# Patient Record
Sex: MALE | Race: WHITE | NOT HISPANIC OR LATINO | Employment: FULL TIME | URBAN - METROPOLITAN AREA
[De-identification: names, ages, dates, MRNs, and addresses within clinical notes are randomized per-mention and may not be internally consistent; named-entity substitution may affect disease eponyms.]

---

## 2018-10-30 ENCOUNTER — OFFICE VISIT (OUTPATIENT)
Dept: SLEEP MEDICINE | Facility: HOSPITAL | Age: 23
End: 2018-10-30
Attending: INTERNAL MEDICINE

## 2018-10-30 VITALS
SYSTOLIC BLOOD PRESSURE: 91 MMHG | HEIGHT: 74 IN | DIASTOLIC BLOOD PRESSURE: 59 MMHG | BODY MASS INDEX: 21.43 KG/M2 | HEART RATE: 66 BPM | OXYGEN SATURATION: 99 % | WEIGHT: 167 LBS

## 2018-10-30 DIAGNOSIS — R06.83 SNORING: Primary | ICD-10-CM

## 2018-10-30 DIAGNOSIS — F51.12 INSUFFICIENT SLEEP SYNDROME: ICD-10-CM

## 2018-10-30 DIAGNOSIS — G47.33 OSA (OBSTRUCTIVE SLEEP APNEA): ICD-10-CM

## 2018-10-30 PROCEDURE — G0463 HOSPITAL OUTPT CLINIC VISIT: HCPCS

## 2018-10-30 NOTE — PROGRESS NOTES
[facility]      Patient Care Team:  Campbell Plascencia MD as PCP - General (Family Medicine)    Referring Provider: Val Sumner MD    Chief complaint:   Concerns for sleep apnea.  Feeling tired all day long.    History of present illness:  This is a 22-year-old male patient with no significant past medical history who presented for evaluation of daytime fatigue and sleepiness.    He stated that he watched his grandfather had similar symptoms in the past and he was ultimately diagnosed with sleep apnea.  Patient endorsed waking up gasping but this does not occur daily and it happens occasionally.  He also reported loud snoring which disturb people around him.  He said that the snoring has been going on for years.  His mom used to complain about a lot.  He also said that he awakens himself snoring sometimes.  He was also told that he stops breathing on occasions.      He is not bothered by the snoring however he does get very sleepy throughout the day.  He said that he falls asleep often when not engaged in activities.     Sleep schedule:  -Bedtime:  2-3 AM on the weekdays  -Sleep latency:  Few minutes  -Wake up time:  6:48 AM on the weekdays and 11 AM-1 PM on the weekends , does  not feel refreshed  -Nocturnal awakening:  None   -Perceived sleep hours:  2-4 hours on the weekdays and 5-7 hours on the weekends    ESS: Total score: 17 . He denies sleep paralysis, hallucination or cataplexy.    Review of Systems  Constitutional: No fever or chills. Poor appetite.  ENMT: Nasal congestion but no postnasal drip or hoarsness  Cardiovascular: No chest pain, palpitation or legs swelling.    Respiratory: Shortness of breath on exertion and cough.  Gastrointestinal: No constipation, diarrhea, abdominal pain or acid reflux  Neurology: No headache, weakness, numbness or dizziness.   Musculoskeletal: No joints pain, stiffness or swelling.   Psychiatry: Anxiety and  "depression.  Hem/Lymphatic: No swollen glands or easy bruising.  Integumentary: No rash.  Endocrinology: No excessive thirst but warm intolerance.   Urinary: No dysuria, bloody urine or frequent urination.     History  Past medical history:  Anxiety and depression reportedly    Past surgical history:  None    Medications:  Benadryl  Zyrtec    Allergy:  NKDA    Social history:  Patient works at the [employer] store.  He does smoke one pack of cigarettes daily since the age of 21 years old.  He does not drink alcohol or caffeinated beverages.    Family history:  Positive for sleep apnea in his grandfather.  Positive for stroke, diabetes, asthma and hypertension.      Vital Signs:  Vitals:    10/30/18 0900   BP: 91/59   Pulse: 66   SpO2: 99%   Weight: 75.8 kg (167 lb)   Height: 188 cm (74\")     Body mass index is 21.44 kg/m².  Neck Circumference: 15 inches     Physical Exam:  Neck Circumference: 15 inches     Constitutional: Not in acute distress.  Eyes: Injected conjunctiva, EOMI.  ENMT: Narayan score 3. Mallampati score 3. No oral thrush.   Neck: Large. No thyromegaly.    Heart: Regular rhythm and rate, no murmur  Lungs: Good and equal air entry bilaterally. No crackles or wheezing.         Abdomen: Soft   Extremities: No cyanosis, clubbing or pitting edema. Moves all extremities.  Neuro: Conscious, alert, oriented x3.   Psych: Appropriate mood and affect.    Integumentary: No rash  Lymphatic: No palpable cervical or supraclavicular lymph nodes.       Assessment:  Diagnoses and all orders for this visit:    Snoring    Insufficient sleep syndrome    He is at moderate risk for obstructive sleep apnea based on his symptoms but his main issue appears to be insufficient sleep causing hypersomnia.    Plan:  Discussed the importance of sleep hygiene with the patient and the need to obtain at least 7-8 hours of sleep every night.    Will check a home sleep apnea testing to see if he has underlying sleep apnea.  I discussed " the pathophysiology of sleep apnea with the patient, testing and therapy.  He is agreeable with treatment.  He believes he has sleep apnea since it runs in his family.  He would be a good candidate for auto CPAP therapy if he has sleep apnea.        Dallas Manzo MD  10/30/18  11:40 AM    This note was dictated utilizing Dragon dictation

## 2018-11-20 ENCOUNTER — APPOINTMENT (OUTPATIENT)
Dept: SLEEP MEDICINE | Facility: HOSPITAL | Age: 23
End: 2018-11-20
Attending: INTERNAL MEDICINE

## 2018-12-17 ENCOUNTER — HOSPITAL ENCOUNTER (OUTPATIENT)
Dept: SLEEP MEDICINE | Facility: HOSPITAL | Age: 23
Discharge: HOME | End: 2018-12-17
Attending: INTERNAL MEDICINE | Admitting: INTERNAL MEDICINE

## 2018-12-17 DIAGNOSIS — G47.33 OSA (OBSTRUCTIVE SLEEP APNEA): ICD-10-CM

## 2018-12-17 PROCEDURE — 95806 SLEEP STUDY UNATT&RESP EFFT: CPT

## 2018-12-28 ENCOUNTER — TELEPHONE (OUTPATIENT)
Dept: SLEEP MEDICINE | Facility: HOSPITAL | Age: 23
End: 2018-12-28

## 2018-12-28 NOTE — TELEPHONE ENCOUNTER
Tech called pts home #, pts mother answered and stated could give her the study results. Patient is no longer a minor so not documentation on release of information seen to patients mother. Patients mother gave tech his cell # to give results to. Tech called [phone] pts cell. Patient answered and went over neg study results. Informed patient should he have questions or like to schedule F/U eun to discuss he could and staff would be back in office Weds in view of holidays. MAB

## 2019-01-15 ENCOUNTER — OFFICE VISIT (OUTPATIENT)
Dept: SLEEP MEDICINE | Facility: HOSPITAL | Age: 24
End: 2019-01-15
Attending: INTERNAL MEDICINE

## 2019-01-15 VITALS
SYSTOLIC BLOOD PRESSURE: 108 MMHG | DIASTOLIC BLOOD PRESSURE: 66 MMHG | WEIGHT: 171 LBS | BODY MASS INDEX: 21.94 KG/M2 | HEIGHT: 74 IN | HEART RATE: 72 BPM

## 2019-01-15 DIAGNOSIS — F51.12 INSUFFICIENT SLEEP SYNDROME: ICD-10-CM

## 2019-01-15 DIAGNOSIS — R06.83 SNORING: Primary | ICD-10-CM

## 2019-01-15 PROCEDURE — G0463 HOSPITAL OUTPT CLINIC VISIT: HCPCS

## 2019-01-15 NOTE — PROGRESS NOTES
"                      [facility]                          Chief Complaint:   Follow up for the result of the sleep study    History of present illness:   Patient is a 23 y.o. male  Patient with history of Depression and Anxiety who complains of loud snoring, gasping and witnessed apneas.     No changes in symptoms since the last visit however he stated that when he did the sleep test, he woke up feeling more rejuvenated.     Sleep schedule:  Variable.    ESS: Total score: 15         Vital Signs:  Vitals:    01/15/19 0909   BP: 108/66   Pulse: 72   Weight: 77.6 kg (171 lb)   Height: 188 cm (74\")     Body mass index is 21.96 kg/m².          Physical Exam:   General Appearance:    Alert, cooperative, in no acute distress   HEENT:  Narayan score 3. Mallampati score 3. No oral thrush.    Lungs:     Clear to auscultation,respirations regular, even and                  unlabored    Heart:    Regular rhythm and normal rate, normal S1 and S2, no            murmur, no gallop, no rub, no click   Abdomen:     Normal bowel sounds, no masses, no organomegaly, soft        non-tender, non-distended, no guarding, no rebound                tenderness   Neuro:   Conscious, alert, oriented x3. Appropriate mood and affect.    Extremities:   Moves all extremities well, no edema, no cyanosis, no             Redness          Diagnostic data:    HST was performed on:12/18/18:    total RADHA: 2 events/h. Supine time was 294.9 minutes resulting in Supine RADHA: 2.2 events/hour.  KAREEN=3.3 events/h; Philip SpO2=91 %.      ASSESSMENT:  Diagnoses and all orders for this visit:    Snoring    Insufficient sleep syndrome      PLAN:  I discussed the result of the sleep test.  I reassured the patient about the result of the sleep test and the absence of sleep apnea.  We discussed treatment of snoring with positional therapy if needed.    Concerning the excessive daytime sleepiness, I think this is related to " insufficient sleep syndrome and he has to increase his sleep time to 7-8 hours at night.                This note was dictated utilizing "[employer] dictation

## 2021-01-08 ENCOUNTER — OFFICE VISIT (OUTPATIENT)
Dept: FAMILY MEDICINE CLINIC | Facility: CLINIC | Age: 26
End: 2021-01-08

## 2021-01-08 VITALS
OXYGEN SATURATION: 99 % | WEIGHT: 155 LBS | HEART RATE: 59 BPM | BODY MASS INDEX: 19.89 KG/M2 | SYSTOLIC BLOOD PRESSURE: 114 MMHG | TEMPERATURE: 97.7 F | HEIGHT: 74 IN | DIASTOLIC BLOOD PRESSURE: 68 MMHG

## 2021-01-08 DIAGNOSIS — K21.9 GASTROESOPHAGEAL REFLUX DISEASE WITHOUT ESOPHAGITIS: ICD-10-CM

## 2021-01-08 DIAGNOSIS — H91.90: Primary | ICD-10-CM

## 2021-01-08 DIAGNOSIS — F32.9 REACTIVE DEPRESSION: ICD-10-CM

## 2021-01-08 DIAGNOSIS — F41.9 ANXIETY: ICD-10-CM

## 2021-01-08 PROCEDURE — 99204 OFFICE O/P NEW MOD 45 MIN: CPT | Performed by: FAMILY MEDICINE

## 2021-01-08 RX ORDER — ESCITALOPRAM OXALATE 10 MG/1
10 TABLET ORAL DAILY
Qty: 90 TABLET | Refills: 3 | Status: SHIPPED | OUTPATIENT
Start: 2021-01-08 | End: 2021-02-12 | Stop reason: ALTCHOICE

## 2021-01-08 RX ORDER — OMEPRAZOLE 40 MG/1
40 CAPSULE, DELAYED RELEASE ORAL DAILY
Qty: 90 CAPSULE | Refills: 1 | Status: SHIPPED | OUTPATIENT
Start: 2021-01-08 | End: 2021-01-11

## 2021-01-08 NOTE — PROGRESS NOTES
Subjective   Wesley Brothers is a 25 y.o. male.     Chief Complaint   Patient presents with   • Establish Care   • Heartburn   • Anxiety     lost gma and uncle 1 week apart       History of Present Illness   She has been having heartburn. Belching. For months.     Patient is been a having anxiety.  Started when he lost his great grandma and his uncle 1 week apart a month ago.  Has been going on for years but was controllable but worse for a month. Recently quit smoking. Some depressed moods.    Does have hearing loss and wears hearing aides.     The following portions of the patient's history were reviewed and updated as appropriate: allergies, current medications, past family history, past medical history, past social history, past surgical history and problem list.    Past Medical History:   Diagnosis Date   • HL (hearing loss)     hearing aids       History reviewed. No pertinent surgical history.    Family History   Problem Relation Age of Onset   • Anxiety disorder Mother    • Diabetes Maternal Grandmother    • Anxiety disorder Maternal Grandmother         past with a divorce   • Hypertension Father        Social History     Socioeconomic History   • Marital status: Single     Spouse name: Not on file   • Number of children: Not on file   • Years of education: Not on file   • Highest education level: Not on file   Tobacco Use   • Smoking status: Former Smoker   • Smokeless tobacco: Never Used   Substance and Sexual Activity   • Alcohol use: Not Currently   • Drug use: Not Currently       Review of Systems   Constitutional: Negative for fatigue and fever.   HENT: Positive for hearing loss. Negative for ear pain.    Eyes: Negative for pain and visual disturbance.   Respiratory: Negative for chest tightness and shortness of breath.    Cardiovascular: Negative for chest pain and palpitations.   Gastrointestinal: Negative for constipation and diarrhea.   Genitourinary: Negative for dysuria and urinary incontinence.  "  Musculoskeletal: Negative for arthralgias and myalgias.   Skin: Negative for rash and skin lesions.   Neurological: Negative for headache and memory problem.   Psychiatric/Behavioral: Negative for depressed mood. The patient is not nervous/anxious.        Objective   Visit Vitals  /68   Pulse 59   Temp 97.7 °F (36.5 °C) (Infrared)   Ht 188 cm (74\")   Wt 70.3 kg (155 lb)   SpO2 99%   BMI 19.90 kg/m²     Body mass index is 19.9 kg/m².  Physical Exam  Constitutional:       Appearance: Normal appearance. He is well-developed.   Cardiovascular:      Rate and Rhythm: Normal rate and regular rhythm.      Heart sounds: Normal heart sounds.   Pulmonary:      Effort: Pulmonary effort is normal.      Breath sounds: Normal breath sounds.   Abdominal:      General: Abdomen is flat. There is no distension.      Palpations: Abdomen is soft.      Tenderness: There is no abdominal tenderness.   Musculoskeletal: Normal range of motion.         General: No swelling.   Skin:     General: Skin is warm and dry.      Findings: No rash.   Neurological:      General: No focal deficit present.      Mental Status: He is alert and oriented to person, place, and time.   Psychiatric:         Mood and Affect: Mood normal.         Behavior: Behavior normal.           Assessment/Plan   Diagnoses and all orders for this visit:    1. Hereditary hearing loss (Primary)    2. Gastroesophageal reflux disease without esophagitis  -     omeprazole (PrilOSEC) 40 MG capsule; Take 1 capsule by mouth Daily.  Dispense: 90 capsule; Refill: 1    3. Anxiety    4. Reactive depression    Other orders  -     escitalopram (Lexapro) 10 MG tablet; Take 1 tablet by mouth Daily.  Dispense: 90 tablet; Refill: 3        Discussed risk and benefits of medication.  Follow-up in 1 month.  Gave counseling resources.       "

## 2021-01-08 NOTE — TELEPHONE ENCOUNTER
Caller: Wesley Brothers    Relationship: Self    Best call back number: [phone] (H)    Medication needed:   Requested Prescriptions     Pending Prescriptions Disp Refills   • omeprazole (PrilOSEC) 40 MG capsule 90 capsule 1     Sig: Take 1 capsule by mouth Daily.       When do you need the refill by: 01/08/21    What details did the patient provide when requesting the medication: PATIENT WAS JUST PRESCRIBED MEDICIATION TODAY. PHARMACY STATES NEED PRIOR AUTHORIZATION     Does the patient have less than a 3 day supply:  [x] Yes  [] No    What is the patient's preferred pharmacy: [id number] [place], [place] - [address] [facility] [address] [facility] [phone] [facility] [phone]

## 2021-01-11 ENCOUNTER — TELEPHONE (OUTPATIENT)
Dept: FAMILY MEDICINE CLINIC | Facility: CLINIC | Age: 26
End: 2021-01-11

## 2021-01-11 DIAGNOSIS — K21.9 GASTROESOPHAGEAL REFLUX DISEASE WITHOUT ESOPHAGITIS: Primary | ICD-10-CM

## 2021-01-11 RX ORDER — OMEPRAZOLE 40 MG/1
40 CAPSULE, DELAYED RELEASE ORAL DAILY
Qty: 90 CAPSULE | Refills: 1 | OUTPATIENT
Start: 2021-01-11

## 2021-01-11 RX ORDER — ESOMEPRAZOLE MAGNESIUM 40 MG/1
40 CAPSULE, DELAYED RELEASE ORAL
Qty: 90 CAPSULE | Refills: 1 | Status: SHIPPED | OUTPATIENT
Start: 2021-01-11 | End: 2021-08-11 | Stop reason: SDUPTHER

## 2021-01-11 NOTE — TELEPHONE ENCOUNTER
THE PATIENT CALLED IN AND STATED THAT HE NEEDS A PRIOR AUTHORIZATION ON A MEDICATION. THE MEDICATION PRILOSEC AND NEXIUM.    PLEASE ADVISE.    CALLBACK NUMBER: [phone]

## 2021-01-13 ENCOUNTER — TELEPHONE (OUTPATIENT)
Dept: FAMILY MEDICINE CLINIC | Facility: CLINIC | Age: 26
End: 2021-01-13

## 2021-01-13 NOTE — TELEPHONE ENCOUNTER
Spoke with pt and advised that he stop taking medication and if he is feeling  SOA, tongue or throat swelling and trouble swallowing.  He needs to go to the ER.

## 2021-01-13 NOTE — TELEPHONE ENCOUNTER
PATIENT IS CALLING TO LET Aylin SAAVEDRA KNOW THAT HE IS EXPERIENCING SIDE EFFECTS OIF THE MEDICATION ESCITALOPRAM 10MG. HE IS HAVING DIARRHEA,  MAKING HIS SHORTNESS OF BREATH WORSE FROM HIS ANXIETY, AND HIS TONGUE AND MOUTH FELT VERY DRY AND POSSIBLY SWOLLEN. HE IS STILL TAKING THE MEDICATION. PATIENT IS NOT SURE IF HE SHOULD KEEP TAKING IT OR STOP TAKING IT IMMEDIATELY.     PLEASE ADVISE.    CONTACT: [phone] (H)     CAN ALSO BE REACHED AT HIS GRANDMOTHER'S PHONE NUMBER [phone]

## 2021-02-12 ENCOUNTER — OFFICE VISIT (OUTPATIENT)
Dept: FAMILY MEDICINE CLINIC | Facility: CLINIC | Age: 26
End: 2021-02-12

## 2021-02-12 ENCOUNTER — HOSPITAL ENCOUNTER (OUTPATIENT)
Dept: GENERAL RADIOLOGY | Facility: HOSPITAL | Age: 26
Discharge: HOME | End: 2021-02-12
Admitting: FAMILY MEDICINE

## 2021-02-12 VITALS
OXYGEN SATURATION: 100 % | HEIGHT: 74 IN | BODY MASS INDEX: 20.3 KG/M2 | TEMPERATURE: 98.7 F | SYSTOLIC BLOOD PRESSURE: 120 MMHG | DIASTOLIC BLOOD PRESSURE: 80 MMHG | HEART RATE: 76 BPM | WEIGHT: 158.2 LBS

## 2021-02-12 DIAGNOSIS — F32.1 CURRENT MODERATE EPISODE OF MAJOR DEPRESSIVE DISORDER, UNSPECIFIED WHETHER RECURRENT (HCC): ICD-10-CM

## 2021-02-12 DIAGNOSIS — R06.02 SHORTNESS OF BREATH: Primary | ICD-10-CM

## 2021-02-12 DIAGNOSIS — Z00.00 WELLNESS EXAMINATION: ICD-10-CM

## 2021-02-12 DIAGNOSIS — F41.9 ANXIETY: ICD-10-CM

## 2021-02-12 DIAGNOSIS — F32.9 REACTIVE DEPRESSION: ICD-10-CM

## 2021-02-12 LAB
BILIRUB BLD-MCNC: NEGATIVE MG/DL
CLARITY, POC: ABNORMAL
COLOR UR: YELLOW
GLUCOSE UR STRIP-MCNC: NEGATIVE MG/DL
KETONES UR QL: NEGATIVE
LEUKOCYTE EST, POC: NEGATIVE
NITRITE UR-MCNC: NEGATIVE MG/ML
PH UR: 6 [PH] (ref 5–8)
PROT UR STRIP-MCNC: NEGATIVE MG/DL
RBC # UR STRIP: NEGATIVE /UL
SP GR UR: 1.03 (ref 1–1.03)
UROBILINOGEN UR QL: NORMAL

## 2021-02-12 PROCEDURE — 81003 URINALYSIS AUTO W/O SCOPE: CPT | Performed by: FAMILY MEDICINE

## 2021-02-12 PROCEDURE — 71046 X-RAY EXAM CHEST 2 VIEWS: CPT

## 2021-02-12 PROCEDURE — 93000 ELECTROCARDIOGRAM COMPLETE: CPT | Performed by: FAMILY MEDICINE

## 2021-02-12 PROCEDURE — 99214 OFFICE O/P EST MOD 30 MIN: CPT | Performed by: FAMILY MEDICINE

## 2021-02-12 RX ORDER — EPINEPHRINE 0.3 MG/.3ML
INJECTION SUBCUTANEOUS
COMMUNITY

## 2021-02-12 RX ORDER — VENLAFAXINE HYDROCHLORIDE 37.5 MG/1
37.5 CAPSULE, EXTENDED RELEASE ORAL DAILY
Qty: 30 CAPSULE | Refills: 1 | Status: SHIPPED | OUTPATIENT
Start: 2021-02-12

## 2021-02-12 NOTE — PROGRESS NOTES
"Chief Complaint  Follow-up (anxiety medication ) and Med Refill    Subjective          Wesley Brothers presents to [facility]  History of Present Illness  Med side effect? SOA sometimes, and stopped med, took it  x 3 days, then stopped , now better ,  no CP, no cough, no Asthma, no smoker, Anxiety good today,but +  Depression, not suicidal/homicidal ideation lost his grandma recently, no abdominal pain, no fever, no cough, seen by counselor ready, declining to see a psychiatrist        ECG 12 Lead    Date/Time: 2/12/2021 10:52 AM  Performed by: Campbell Plascencia MD  Authorized by: Campbell Plascencia MD   Comparison: not compared with previous ECG   Previous ECG: no previous ECG available  Rhythm: sinus bradycardia and sinus arrhythmia  Rate: normal  Conduction: conduction normal  ST Segments: ST segments normal  T Waves: T waves normal  QRS axis: normal  Other: no other findings    Clinical impression: abnormal EKG              Objective   Vital Signs:   /80 (BP Location: Left arm, Patient Position: Sitting)   Pulse 76   Temp 98.7 °F (37.1 °C) (Temporal)   Ht 188 cm (74.02\")   Wt 71.8 kg (158 lb 3.2 oz)   SpO2 100%   BMI 20.30 kg/m²     Physical Exam   Result Review :            ECG 12 Lead    Date/Time: 2/12/2021 10:52 AM  Performed by: Campbell Plascencia MD  Authorized by: Campbell Plascencia MD   Comparison: not compared with previous ECG   Previous ECG: no previous ECG available  Rhythm: sinus bradycardia and sinus arrhythmia  Rate: normal  Conduction: conduction normal  ST Segments: ST segments normal  T Waves: T waves normal  QRS axis: normal  Other: no other findings    Clinical impression: abnormal EKG              Assessment and Plan    Diagnoses and all orders for this visit:    1. Shortness of breath (Primary)  -     ECG 12 Lead  -     XR Chest 2 View  -     Ambulatory Referral to Cardiology    2. Current moderate episode of major depressive " disorder, unspecified whether recurrent (CMS/HCC)    3. Wellness examination  -     Cancel: CBC & Differential; Future  -     Cancel: Comprehensive Metabolic Panel; Future  -     Cancel: Lipid Panel; Future  -     Cancel: TSH; Future  -     Cancel: POC Urinalysis Dipstick, Automated; Future  -     CBC & Differential  -     Comprehensive Metabolic Panel  -     Lipid Panel  -     TSH  -     POC Urinalysis Dipstick, Automated    4. Anxiety  -     venlafaxine XR (Effexor XR) 37.5 MG 24 hr capsule; Take 1 capsule by mouth Daily.  Dispense: 30 capsule; Refill: 1    5. Reactive depression  -     venlafaxine XR (Effexor XR) 37.5 MG 24 hr capsule; Take 1 capsule by mouth Daily.  Dispense: 30 capsule; Refill: 1        Follow Up   Return in about 4 weeks (around 3/12/2021).  Patient was given instructions and counseling regarding his condition or for health maintenance advice. Please see specific information pulled into the AVS if appropriate.     UA d/w pt

## 2021-02-13 LAB
ALBUMIN SERPL-MCNC: 5 G/DL (ref 3.5–5.2)
ALBUMIN/GLOB SERPL: 1.9 G/DL
ALP SERPL-CCNC: 51 U/L (ref 39–117)
ALT SERPL-CCNC: 17 U/L (ref 1–41)
AST SERPL-CCNC: 16 U/L (ref 1–40)
BASOPHILS # BLD AUTO: 0.04 10*3/MM3 (ref 0–0.2)
BASOPHILS NFR BLD AUTO: 0.9 % (ref 0–1.5)
BILIRUB SERPL-MCNC: 0.5 MG/DL (ref 0–1.2)
BUN SERPL-MCNC: 12 MG/DL (ref 6–20)
BUN/CREAT SERPL: 11.3 (ref 7–25)
CALCIUM SERPL-MCNC: 9.8 MG/DL (ref 8.6–10.5)
CHLORIDE SERPL-SCNC: 98 MMOL/L (ref 98–107)
CHOLEST SERPL-MCNC: 181 MG/DL (ref 0–200)
CO2 SERPL-SCNC: 31.2 MMOL/L (ref 22–29)
CREAT SERPL-MCNC: 1.06 MG/DL (ref 0.76–1.27)
EOSINOPHIL # BLD AUTO: 0.09 10*3/MM3 (ref 0–0.4)
EOSINOPHIL NFR BLD AUTO: 2 % (ref 0.3–6.2)
ERYTHROCYTE [DISTWIDTH] IN BLOOD BY AUTOMATED COUNT: 13.6 % (ref 12.3–15.4)
GLOBULIN SER CALC-MCNC: 2.6 GM/DL
GLUCOSE SERPL-MCNC: 90 MG/DL (ref 65–99)
HCT VFR BLD AUTO: 51.3 % (ref 37.5–51)
HDLC SERPL-MCNC: 69 MG/DL (ref 40–60)
HGB BLD-MCNC: 16.8 G/DL (ref 13–17.7)
IMM GRANULOCYTES # BLD AUTO: 0.01 10*3/MM3 (ref 0–0.05)
IMM GRANULOCYTES NFR BLD AUTO: 0.2 % (ref 0–0.5)
LDLC SERPL CALC-MCNC: 101 MG/DL (ref 0–100)
LYMPHOCYTES # BLD AUTO: 1.18 10*3/MM3 (ref 0.7–3.1)
LYMPHOCYTES NFR BLD AUTO: 26.6 % (ref 19.6–45.3)
MCH RBC QN AUTO: 29.6 PG (ref 26.6–33)
MCHC RBC AUTO-ENTMCNC: 32.7 G/DL (ref 31.5–35.7)
MCV RBC AUTO: 90.3 FL (ref 79–97)
MONOCYTES # BLD AUTO: 0.47 10*3/MM3 (ref 0.1–0.9)
MONOCYTES NFR BLD AUTO: 10.6 % (ref 5–12)
NEUTROPHILS # BLD AUTO: 2.64 10*3/MM3 (ref 1.7–7)
NEUTROPHILS NFR BLD AUTO: 59.7 % (ref 42.7–76)
NRBC BLD AUTO-RTO: 0 /100 WBC (ref 0–0.2)
PLATELET # BLD AUTO: 242 10*3/MM3 (ref 140–450)
POTASSIUM SERPL-SCNC: 4.7 MMOL/L (ref 3.5–5.2)
PROT SERPL-MCNC: 7.6 G/DL (ref 6–8.5)
RBC # BLD AUTO: 5.68 10*6/MM3 (ref 4.14–5.8)
SODIUM SERPL-SCNC: 139 MMOL/L (ref 136–145)
TRIGL SERPL-MCNC: 58 MG/DL (ref 0–150)
TSH SERPL DL<=0.005 MIU/L-ACNC: 1.68 UIU/ML (ref 0.27–4.2)
VLDLC SERPL CALC-MCNC: 11 MG/DL (ref 5–40)
WBC # BLD AUTO: 4.43 10*3/MM3 (ref 3.4–10.8)

## 2021-02-15 ENCOUNTER — TELEPHONE (OUTPATIENT)
Dept: FAMILY MEDICINE CLINIC | Facility: CLINIC | Age: 26
End: 2021-02-15

## 2021-02-15 NOTE — TELEPHONE ENCOUNTER
----- Message from Campbell Lepe MD sent at 2/14/2021  8:35 AM EST -----  Please call the patient regarding his result. Normal CXR

## 2021-02-18 ENCOUNTER — OFFICE VISIT (OUTPATIENT)
Dept: CARDIOLOGY | Facility: CLINIC | Age: 26
End: 2021-02-18

## 2021-02-18 ENCOUNTER — TELEPHONE (OUTPATIENT)
Dept: CARDIOLOGY | Facility: CLINIC | Age: 26
End: 2021-02-18

## 2021-02-18 ENCOUNTER — HOSPITAL ENCOUNTER (OUTPATIENT)
Dept: CARDIOLOGY | Facility: HOSPITAL | Age: 26
Discharge: HOME | End: 2021-02-18

## 2021-02-18 VITALS
BODY MASS INDEX: 20.67 KG/M2 | DIASTOLIC BLOOD PRESSURE: 64 MMHG | HEART RATE: 56 BPM | SYSTOLIC BLOOD PRESSURE: 110 MMHG | HEIGHT: 73 IN | WEIGHT: 156 LBS

## 2021-02-18 DIAGNOSIS — R06.02 SHORTNESS OF BREATH: ICD-10-CM

## 2021-02-18 DIAGNOSIS — R07.2 PRECORDIAL PAIN: ICD-10-CM

## 2021-02-18 DIAGNOSIS — R06.02 SHORTNESS OF BREATH: Primary | ICD-10-CM

## 2021-02-18 LAB
BH CV STRESS BP STAGE 1: NORMAL
BH CV STRESS BP STAGE 2: NORMAL
BH CV STRESS BP STAGE 3: NORMAL
BH CV STRESS DURATION MIN STAGE 1: 3
BH CV STRESS DURATION MIN STAGE 2: 3
BH CV STRESS DURATION MIN STAGE 3: 3
BH CV STRESS DURATION MIN STAGE 4: 2
BH CV STRESS DURATION SEC STAGE 1: 0
BH CV STRESS DURATION SEC STAGE 2: 0
BH CV STRESS DURATION SEC STAGE 3: 0
BH CV STRESS DURATION SEC STAGE 4: 0
BH CV STRESS GRADE STAGE 1: 10
BH CV STRESS GRADE STAGE 2: 12
BH CV STRESS GRADE STAGE 3: 14
BH CV STRESS GRADE STAGE 4: 16
BH CV STRESS HR STAGE 1: 102
BH CV STRESS HR STAGE 2: 121
BH CV STRESS HR STAGE 3: 160
BH CV STRESS HR STAGE 4: 176
BH CV STRESS METS STAGE 1: 5
BH CV STRESS METS STAGE 2: 7.5
BH CV STRESS METS STAGE 3: 10
BH CV STRESS METS STAGE 4: 13.5
BH CV STRESS PROTOCOL 1: NORMAL
BH CV STRESS RECOVERY BP: NORMAL MMHG
BH CV STRESS RECOVERY HR: 103 BPM
BH CV STRESS SPEED STAGE 1: 1.7
BH CV STRESS SPEED STAGE 2: 2.5
BH CV STRESS SPEED STAGE 3: 3.4
BH CV STRESS SPEED STAGE 4: 4.2
BH CV STRESS STAGE 1: 1
BH CV STRESS STAGE 2: 2
BH CV STRESS STAGE 3: 3
BH CV STRESS STAGE 4: 4
MAXIMAL PREDICTED HEART RATE: 196 BPM
PERCENT MAX PREDICTED HR: 89.8 %
STRESS BASELINE BP: NORMAL MMHG
STRESS BASELINE HR: 70 BPM
STRESS PERCENT HR: 106 %
STRESS POST ESTIMATED WORKLOAD: 12 METS
STRESS POST EXERCISE DUR MIN: 11 MIN
STRESS POST EXERCISE DUR SEC: 0 SEC
STRESS POST PEAK BP: NORMAL MMHG
STRESS POST PEAK HR: 176 BPM
STRESS TARGET HR: 167 BPM

## 2021-02-18 PROCEDURE — 93306 TTE W/DOPPLER COMPLETE: CPT

## 2021-02-18 PROCEDURE — 93018 CV STRESS TEST I&R ONLY: CPT | Performed by: INTERNAL MEDICINE

## 2021-02-18 PROCEDURE — 93000 ELECTROCARDIOGRAM COMPLETE: CPT | Performed by: INTERNAL MEDICINE

## 2021-02-18 PROCEDURE — 99204 OFFICE O/P NEW MOD 45 MIN: CPT | Performed by: INTERNAL MEDICINE

## 2021-02-18 PROCEDURE — 93306 TTE W/DOPPLER COMPLETE: CPT | Performed by: INTERNAL MEDICINE

## 2021-02-18 PROCEDURE — 93017 CV STRESS TEST TRACING ONLY: CPT

## 2021-02-18 PROCEDURE — 93016 CV STRESS TEST SUPVJ ONLY: CPT | Performed by: INTERNAL MEDICINE

## 2021-02-18 NOTE — PROGRESS NOTES
Date of Office Visit: 2021  Encounter Provider: Beth Mcdonough MD  Place of Service: [facility]  Patient Name: Wesley Brothers  :1995    Chief complaint  Consult requested by Dr. Lepe for evaluation of shortness of breath chest discomfort and snoring     History of Present Illness  Patient is a 25-year-old male with history of depression.he has snoing(negative sleep study ).  He states that the day before his birthday 1 2019 he decided to stop smoking and then the next day his great-grandmother  and another family member  shortly thereafter and he had midsternal chest tightness with shortness of breath on his birthday.  This is happened intermittently since then though he cannot give me specifics of how long it lasts it is a midsternal tightness is nonradiating.  He denies any palpitations.  He recently saw Dr. Lepe and and EKG was abnormal with sinus bradycardia with heart rate of 58 bpm and sinus arrhythmia.      He states he does no regular exercise moderately active he has not been smoking marijuana or nicotine.  He has had sporadic episodes of shortness of breath that occurs when he is resting quietly if there is no trigger points.  Chest pain is as above    Blood work on  with , HDL 69, triglycerides 58, normal CMP    Past Medical History:   Diagnosis Date   • Anxiety    • Current moderate episode of major depressive disorder (CMS/HCC)    • History of snoring    • HL (hearing loss)     hearing aids   • Insufficient sleep syndrome    • Reactive depression    • Shortness of breath    • Snoring      No past surgical history on file.  Outpatient Medications Prior to Visit   Medication Sig Dispense Refill   • EPINEPHrine (EpiPen 2-Patric) 0.3 MG/0.3ML solution auto-injector injection      • esomeprazole (nexIUM) 40 MG capsule Take 1 capsule by mouth Every Morning Before Breakfast. 90 capsule 1   • venlafaxine XR (Effexor XR) 37.5 MG 24  hr capsule Take 1 capsule by mouth Daily. 30 capsule 1   • Simethicone (GAS-X PO) Take  by mouth.       No facility-administered medications prior to visit.        Allergies as of 2021 - Reviewed 2021   Allergen Reaction Noted   • Fish-derived products Anaphylaxis 2017   • Uncaria tomentosa (cats claw) Swelling 2017     Social History     Socioeconomic History   • Marital status: Single     Spouse name: Not on file   • Number of children: Not on file   • Years of education: Not on file   • Highest education level: Not on file   Tobacco Use   • Smoking status: Former Smoker     Types: Cigars     Quit date: 2020     Years since quittin.2   • Smokeless tobacco: Never Used   • Tobacco comment: black and mild 3 a day   Substance and Sexual Activity   • Alcohol use: Not Currently   • Drug use: Not Currently     Types: Marijuana     Comment: Stopped 2020   • Sexual activity: Defer     Family History   Problem Relation Age of Onset   • Anxiety disorder Mother    • Diabetes Maternal Grandmother    • Anxiety disorder Maternal Grandmother         past with a divorce   • Stroke Maternal Grandmother    • Hypertension Father      Review of Systems   Constitution: Negative for fever, malaise/fatigue, weight gain and weight loss.   HENT: Positive for hearing loss. Negative for ear pain, nosebleeds and sore throat.    Eyes: Negative for double vision, pain, vision loss in left eye and vision loss in right eye.   Cardiovascular:        See history of present illness.   Respiratory: Positive for shortness of breath and wheezing. Negative for cough, sleep disturbances due to breathing and snoring.    Endocrine: Negative for cold intolerance, heat intolerance and polyuria.   Skin: Negative for itching, poor wound healing and rash.   Musculoskeletal: Negative for joint pain, joint swelling and myalgias.   Gastrointestinal: Negative for abdominal pain, diarrhea, hematochezia, nausea and vomiting.    "  Genitourinary: Negative for hematuria and hesitancy.   Neurological: Negative for numbness, paresthesias and seizures.   Psychiatric/Behavioral: Positive for depression. The patient is nervous/anxious.         Objective:     Vitals:    02/18/21 1117 02/18/21 1123   BP: 110/68 110/64   BP Location: Right arm Left arm   Pulse: 56    Weight: 70.8 kg (156 lb)    Height: 185.4 cm (73\")      Body mass index is 20.58 kg/m².    Vitals signs reviewed.   Constitutional:       Appearance: Well-developed.   Eyes:      General: No scleral icterus.        Right eye: No discharge.      Conjunctiva/sclera: Conjunctivae normal.      Pupils: Pupils are equal, round, and reactive to light.   HENT:      Head: Normocephalic.      Nose: Nose normal.   Neck:      Musculoskeletal: Normal range of motion and neck supple.      Thyroid: No thyromegaly.      Vascular: No JVD.   Pulmonary:      Effort: Pulmonary effort is normal. No respiratory distress.      Breath sounds: Normal breath sounds. No wheezing. No rales.   Cardiovascular:      Normal rate. Regular rhythm. Normal S1. Normal S2.      Murmurs: There is no murmur.      No gallop.   Pulses:     Intact distal pulses.   Edema:     Peripheral edema absent.   Abdominal:      General: Bowel sounds are normal. There is no distension.      Palpations: Abdomen is soft.      Tenderness: There is no abdominal tenderness. There is no rebound.   Musculoskeletal: Normal range of motion.         General: No tenderness.   Skin:     General: Skin is warm and dry.      Findings: No erythema or rash.   Neurological:      Mental Status: Alert and oriented to person, place, and time.   Psychiatric:         Behavior: Behavior normal.         Thought Content: Thought content normal.         Judgment: Judgment normal.       Lab Review:     ECG 12 Lead    Date/Time: 2/18/2021 11:27 AM  Performed by: Beth Mcdonough MD  Authorized by: Beth Mcdonough MD   Comparison: compared with previous ECG   Similar to " previous ECG  Rhythm: sinus rhythm  QRS axis: left    Clinical impression: abnormal EKG          Assessment:       Diagnosis Plan   1. Shortness of breath  ECG 12 Lead    Adult Transthoracic Echo Complete W/ Cont if Necessary Per Protocol   2. Precordial pain  ECG 12 Lead    Adult Transthoracic Echo Complete W/ Cont if Necessary Per Protocol    Treadmill Stress Test     Plan:       1.  Chest discomfort.  While anxiety likely playing a role, EKG slightly abnormal with leftward axis.  The sinus arrhythmia and bradycardia are benign.  We will check an echocardiogram and a treadmill exercise stress test as some of this is also emotionally and exertionally exacerbated  2.  Shortness of breath, as above.  Anxiety also playing a role in the rest symptoms  3.  Borderline abnormal EKG, as above  4.  Anxiety and depression.  This is being addressed by Dr. Lepe   5. Snoring with negative stress test 2018      I spent a total 45 minutes spent including reviewing records and with 20 minutes of face to face time with this patient.       Your medication list          Accurate as of February 18, 2021 11:59 PM. If you have any questions, ask your nurse or doctor.            CONTINUE taking these medications      Instructions Last Dose Given Next Dose Due   EpiPen 2-Patric 0.3 MG/0.3ML solution auto-injector injection  Generic drug: EPINEPHrine           esomeprazole 40 MG capsule  Commonly known as: nexIUM      Take 1 capsule by mouth Every Morning Before Breakfast.       venlafaxine XR 37.5 MG 24 hr capsule  Commonly known as: Effexor XR      Take 1 capsule by mouth Daily.          STOP taking these medications    GAS-X PO  Stopped by: Beth Mcdonough MD               Patient is no longer taking -.  I corrected the med list to reflect this.  I did not stop these medications.    Dictated utilizing Dragon dictation

## 2021-02-19 LAB
ASCENDING AORTA: 2.7 CM
BH CV ECHO MEAS - ACS: 2.1 CM
BH CV ECHO MEAS - AO MAX PG (FULL): 1.2 MMHG
BH CV ECHO MEAS - AO MAX PG: 4 MMHG
BH CV ECHO MEAS - AO MEAN PG (FULL): 0.31 MMHG
BH CV ECHO MEAS - AO MEAN PG: 2.1 MMHG
BH CV ECHO MEAS - AO ROOT AREA (BSA CORRECTED): 1.5
BH CV ECHO MEAS - AO ROOT AREA: 6.8 CM^2
BH CV ECHO MEAS - AO ROOT DIAM: 2.9 CM
BH CV ECHO MEAS - AO V2 MAX: 100.4 CM/SEC
BH CV ECHO MEAS - AO V2 MEAN: 66.8 CM/SEC
BH CV ECHO MEAS - AO V2 VTI: 21.2 CM
BH CV ECHO MEAS - ASC AORTA: 2.7 CM
BH CV ECHO MEAS - AVA(I,A): 2.8 CM^2
BH CV ECHO MEAS - AVA(I,D): 2.8 CM^2
BH CV ECHO MEAS - AVA(V,A): 2.6 CM^2
BH CV ECHO MEAS - AVA(V,D): 2.6 CM^2
BH CV ECHO MEAS - BSA(HAYCOCK): 1.9 M^2
BH CV ECHO MEAS - BSA: 1.9 M^2
BH CV ECHO MEAS - BZI_BMI: 20.6 KILOGRAMS/M^2
BH CV ECHO MEAS - BZI_METRIC_HEIGHT: 185.4 CM
BH CV ECHO MEAS - BZI_METRIC_WEIGHT: 70.8 KG
BH CV ECHO MEAS - EDV(MOD-SP2): 94 ML
BH CV ECHO MEAS - EDV(MOD-SP4): 83 ML
BH CV ECHO MEAS - EDV(TEICH): 83.2 ML
BH CV ECHO MEAS - EF(CUBED): 75.2 %
BH CV ECHO MEAS - EF(MOD-BP): 59.1 %
BH CV ECHO MEAS - EF(MOD-SP2): 61.7 %
BH CV ECHO MEAS - EF(MOD-SP4): 57.8 %
BH CV ECHO MEAS - EF(TEICH): 67.4 %
BH CV ECHO MEAS - ESV(MOD-SP2): 36 ML
BH CV ECHO MEAS - ESV(MOD-SP4): 35 ML
BH CV ECHO MEAS - ESV(TEICH): 27.1 ML
BH CV ECHO MEAS - FS: 37.2 %
BH CV ECHO MEAS - IVS/LVPW: 1
BH CV ECHO MEAS - IVSD: 1.1 CM
BH CV ECHO MEAS - LAT PEAK E' VEL: 14.8 CM/SEC
BH CV ECHO MEAS - LV DIASTOLIC VOL/BSA (35-75): 42.9 ML/M^2
BH CV ECHO MEAS - LV MASS(C)D: 154.8 GRAMS
BH CV ECHO MEAS - LV MASS(C)DI: 80 GRAMS/M^2
BH CV ECHO MEAS - LV MAX PG: 2.8 MMHG
BH CV ECHO MEAS - LV MEAN PG: 1.8 MMHG
BH CV ECHO MEAS - LV SYSTOLIC VOL/BSA (12-30): 18.1 ML/M^2
BH CV ECHO MEAS - LV V1 MAX: 84.4 CM/SEC
BH CV ECHO MEAS - LV V1 MEAN: 64.3 CM/SEC
BH CV ECHO MEAS - LV V1 VTI: 19 CM
BH CV ECHO MEAS - LVIDD: 4.3 CM
BH CV ECHO MEAS - LVIDS: 2.7 CM
BH CV ECHO MEAS - LVLD AP2: 8.5 CM
BH CV ECHO MEAS - LVLD AP4: 8 CM
BH CV ECHO MEAS - LVLS AP2: 7.4 CM
BH CV ECHO MEAS - LVLS AP4: 6.9 CM
BH CV ECHO MEAS - LVOT AREA (M): 3.1 CM^2
BH CV ECHO MEAS - LVOT AREA: 3.1 CM^2
BH CV ECHO MEAS - LVOT DIAM: 2 CM
BH CV ECHO MEAS - LVPWD: 1 CM
BH CV ECHO MEAS - MED PEAK E' VEL: 13.8 CM/SEC
BH CV ECHO MEAS - MR MAX PG: 82.6 MMHG
BH CV ECHO MEAS - MR MAX VEL: 454.4 CM/SEC
BH CV ECHO MEAS - MV A DUR: 0.14 SEC
BH CV ECHO MEAS - MV A MAX VEL: 51.8 CM/SEC
BH CV ECHO MEAS - MV DEC SLOPE: 279.2 CM/SEC^2
BH CV ECHO MEAS - MV DEC TIME: 0.23 SEC
BH CV ECHO MEAS - MV E MAX VEL: 74.1 CM/SEC
BH CV ECHO MEAS - MV E/A: 1.4
BH CV ECHO MEAS - MV MAX PG: 3 MMHG
BH CV ECHO MEAS - MV MEAN PG: 0.82 MMHG
BH CV ECHO MEAS - MV P1/2T MAX VEL: 84.2 CM/SEC
BH CV ECHO MEAS - MV P1/2T: 88.3 MSEC
BH CV ECHO MEAS - MV V2 MAX: 86.2 CM/SEC
BH CV ECHO MEAS - MV V2 MEAN: 40.3 CM/SEC
BH CV ECHO MEAS - MV V2 VTI: 29.5 CM
BH CV ECHO MEAS - MVA P1/2T LCG: 2.6 CM^2
BH CV ECHO MEAS - MVA(P1/2T): 2.5 CM^2
BH CV ECHO MEAS - MVA(VTI): 2 CM^2
BH CV ECHO MEAS - PA ACC TIME: 0.17 SEC
BH CV ECHO MEAS - PA MAX PG (FULL): 2.3 MMHG
BH CV ECHO MEAS - PA MAX PG: 4 MMHG
BH CV ECHO MEAS - PA PR(ACCEL): 4.5 MMHG
BH CV ECHO MEAS - PA V2 MAX: 100.1 CM/SEC
BH CV ECHO MEAS - PULM A REVS DUR: 0.14 SEC
BH CV ECHO MEAS - PULM A REVS VEL: 36.4 CM/SEC
BH CV ECHO MEAS - PULM DIAS VEL: 66 CM/SEC
BH CV ECHO MEAS - PULM S/D: 0.59
BH CV ECHO MEAS - PULM SYS VEL: 39 CM/SEC
BH CV ECHO MEAS - PVA(V,A): 1.6 CM^2
BH CV ECHO MEAS - PVA(V,D): 1.6 CM^2
BH CV ECHO MEAS - QP/QS: 0.77
BH CV ECHO MEAS - RV MAX PG: 1.7 MMHG
BH CV ECHO MEAS - RV MEAN PG: 1.2 MMHG
BH CV ECHO MEAS - RV V1 MAX: 66 CM/SEC
BH CV ECHO MEAS - RV V1 MEAN: 53 CM/SEC
BH CV ECHO MEAS - RV V1 VTI: 18.9 CM
BH CV ECHO MEAS - RVOT AREA: 2.5 CM^2
BH CV ECHO MEAS - RVOT DIAM: 1.8 CM
BH CV ECHO MEAS - SI(AO): 74.3 ML/M^2
BH CV ECHO MEAS - SI(CUBED): 30.9 ML/M^2
BH CV ECHO MEAS - SI(LVOT): 30.9 ML/M^2
BH CV ECHO MEAS - SI(MOD-SP2): 30 ML/M^2
BH CV ECHO MEAS - SI(MOD-SP4): 24.8 ML/M^2
BH CV ECHO MEAS - SI(TEICH): 29 ML/M^2
BH CV ECHO MEAS - SUP REN AO DIAM: 1.7 CM
BH CV ECHO MEAS - SV(AO): 143.8 ML
BH CV ECHO MEAS - SV(CUBED): 59.9 ML
BH CV ECHO MEAS - SV(LVOT): 59.8 ML
BH CV ECHO MEAS - SV(MOD-SP2): 58 ML
BH CV ECHO MEAS - SV(MOD-SP4): 48 ML
BH CV ECHO MEAS - SV(RVOT): 46.4 ML
BH CV ECHO MEAS - SV(TEICH): 56.1 ML
BH CV ECHO MEAS - TAPSE (>1.6): 2.3 CM
BH CV ECHO MEASUREMENTS AVERAGE E/E' RATIO: 5.18
BH CV XLRA - RV BASE: 3.2 CM
BH CV XLRA - RV LENGTH: 6.3 CM
BH CV XLRA - RV MID: 2.6 CM
BH CV XLRA - TDI S': 13.3 CM/SEC
LEFT ATRIUM VOLUME INDEX: 8 ML/M2
LV EF 2D ECHO EST: 59 %
MAXIMAL PREDICTED HEART RATE: 196 BPM
SINUS: 3.3 CM
STJ: 2.5 CM
STRESS TARGET HR: 167 BPM

## 2021-02-19 NOTE — TELEPHONE ENCOUNTER
Please let the pt know the stress test and echo are normal.  No evidence of stress related weakness. Ok to start exercising. caden

## 2021-02-19 NOTE — TELEPHONE ENCOUNTER
Patient notified of results and recomendations and verbalized understanding  Pia Fuentes RN  Triage nurse

## 2021-02-27 PROBLEM — R07.2 PRECORDIAL PAIN: Status: ACTIVE | Noted: 2021-02-27

## 2021-07-12 ENCOUNTER — TELEPHONE (OUTPATIENT)
Dept: FAMILY MEDICINE CLINIC | Facility: CLINIC | Age: 26
End: 2021-07-12

## 2021-07-12 ENCOUNTER — TELEMEDICINE (OUTPATIENT)
Dept: FAMILY MEDICINE CLINIC | Facility: CLINIC | Age: 26
End: 2021-07-12

## 2021-07-12 DIAGNOSIS — J30.2 SEASONAL ALLERGIES: ICD-10-CM

## 2021-07-12 DIAGNOSIS — J01.00 SUBACUTE MAXILLARY SINUSITIS: Primary | ICD-10-CM

## 2021-07-12 PROCEDURE — 99213 OFFICE O/P EST LOW 20 MIN: CPT | Performed by: FAMILY MEDICINE

## 2021-07-12 RX ORDER — AMOXICILLIN AND CLAVULANATE POTASSIUM 875; 125 MG/1; MG/1
1 TABLET, FILM COATED ORAL 2 TIMES DAILY
Qty: 20 TABLET | Refills: 0 | Status: SHIPPED | OUTPATIENT
Start: 2021-07-12 | End: 2021-09-20

## 2021-07-12 NOTE — PROGRESS NOTES
Chief Complaint  No chief complaint on file.  Sinusitis  Subjective          Wesley Brothers presents to [facility]  History of Present Illness  Patient agreed to be contacted by [facility]  Sinus pressure x a week and 1/2 , sneezing and coughing, yellow sputum, ear pain also, L ear ache, no sore throat, no V/D, no COVID vaccine , on Singulair and Benadryl      Objective   Vital Signs:   There were no vitals taken for this visit.      Result Review :                 Assessment and Plan    Diagnoses and all orders for this visit:    1. Subacute maxillary sinusitis (Primary)  -     COVID-19,[facility] ROUTINE, NP/OP SWAB IN TRANSPORT MEDIA OR ESWAB 72 HR TAT - Swab, Nasopharynx; Future  -     amoxicillin-clavulanate (Augmentin) 875-125 MG per tablet; Take 1 tablet by mouth 2 (Two) Times a Day.  Dispense: 20 tablet; Refill: 0    2. Seasonal allergies  Comments:  Benadryl and Singulair        Follow Up   No follow-ups on file.  Patient was given instructions and counseling regarding his condition or for health maintenance advice. Please see specific information pulled into the AVS if appropriate.   Time spent 15 minutes

## 2021-07-12 NOTE — TELEPHONE ENCOUNTER
----- Message from Campbell Lepe MD sent at 7/12/2021  4:41 PM EDT -----  Patient will stop by tomorrow between 9-10 for Covid test, and also needs a note to cover him for work for today ,tomorrow and the day after tomorrow

## 2021-07-12 NOTE — TELEPHONE ENCOUNTER
Patient coming for Covid testing tomorrow morning in-between 9-10 am, patient requested note, note is in chart

## 2021-07-13 DIAGNOSIS — J01.00 SUBACUTE MAXILLARY SINUSITIS: Primary | ICD-10-CM

## 2021-07-13 DIAGNOSIS — J01.00 SUBACUTE MAXILLARY SINUSITIS: ICD-10-CM

## 2021-07-14 LAB
LABCORP SARS-COV-2, NAA 2 DAY TAT: NORMAL
SARS-COV-2 RNA RESP QL NAA+PROBE: NOT DETECTED

## 2021-07-15 ENCOUNTER — TELEPHONE (OUTPATIENT)
Dept: FAMILY MEDICINE CLINIC | Facility: CLINIC | Age: 26
End: 2021-07-15

## 2021-07-15 NOTE — TELEPHONE ENCOUNTER
I called and spoke with patient, I informed him that his covid results were negative, patient verbalized understanding

## 2021-07-15 NOTE — TELEPHONE ENCOUNTER
----- Message from Campbell Lepe MD sent at 7/14/2021  4:18 PM EDT -----  Please call the patient regarding his result.COVID negative

## 2021-07-21 ENCOUNTER — TELEPHONE (OUTPATIENT)
Dept: FAMILY MEDICINE CLINIC | Facility: CLINIC | Age: 26
End: 2021-07-21

## 2021-07-21 NOTE — TELEPHONE ENCOUNTER
Caller: Wesley Brothers    Relationship to patient: Self    Best call back number: [phone]    Chief complaint: CHEST PAIN    Patient directed to call 911 or go to their nearest emergency room.     Patient verbalized understanding: [x] Yes  [] No  If no, why?    Additional notes: PATIENT CALLED IN COMPLAINING OF PAIN IN THE LEFT SIDE OF HIS CHEST AND SHOULDER BLADE AREA. PATIENT WANTING TO KNOW IF HE SHOULD SCHEDULE AN APPOINTMENT OR GO STRAIGHT TO THE EMERGENCY ROOM. ADVISED PATIENT TO GO TO EMERGENCY ROOM OR CALL 911 FOR CHEST PAINS. PATIENT VERBALIZED UNDERSTANDING.

## 2021-07-21 NOTE — TELEPHONE ENCOUNTER
Caller: Wesley Brothers    Relationship: Self    Best call back number: [phone]    What was the call regarding: PLEASE CALL PATIENT REGARDING TESTS HE HAD DONE AT THE ER TODAY. HE WAS NOT ABLE TO STAY TO HEAR RESULTS BUT WOULD LIKE TO TALK TO . PATIENT STATES HE IS STILL HAVING SYMPTOMS.     Do you require a callback: YES

## 2021-07-21 NOTE — TELEPHONE ENCOUNTER
I called and talked with the patient. He did go to the ER, there they did an EKG and CXR. Patient states that once those were finished, he was placed into the waiting room and told to wait to be called back. Patient states he waited 2-3 hours. He was told he was not a serious case. Patient was never seen. I am going to call [facility] and request records from patient's visit.   I asked patient how is pain was, he states it is still there, but is not as constant as it was this morning. I let him know that if it continues and gets worse to go to the hospital. Patient verbalized understanding.

## 2021-07-21 NOTE — TELEPHONE ENCOUNTER
"Per previous telephone encounter: \"I called and talked with the patient. He did go to the ER, there they did an EKG and CXR. Patient states that once those were finished, he was placed into the waiting room and told to wait to be called back. Patient states he waited 2-3 hours. He was told he was not a serious case. Patient was never seen. I am going to call [facility] and request records from patient's visit.   I asked patient how is pain was, he states it is still there, but is not as constant as it was this morning. I let him know that if it continues and gets worse to go to the hospital. Patient verbalized understanding. \"    We do not have patient's results as of right now, going to call in the morning to request medical records.  "

## 2021-07-28 ENCOUNTER — TELEPHONE (OUTPATIENT)
Dept: CARDIOLOGY | Facility: CLINIC | Age: 26
End: 2021-07-28

## 2021-07-28 NOTE — TELEPHONE ENCOUNTER
PATIENT CALLED TO CHECK THE STATUS OF THIS REQUEST, STATING THAT HE STILL HAS NOT HEARD ANYTHING REGARDING THE RESULTS.    PLEASE ADVISE WITH SOME FORM OF AN UPDATE.  [phone]

## 2021-07-28 NOTE — TELEPHONE ENCOUNTER
I spoke with patient's mother, I let her know the results from the patient's ER visit on 07/21/2021, she verbalized understanding.   She did let me know the patient is currently in the hospital.   She will have the patient schedule an appt with cardiology for abnormal EKG

## 2021-08-02 ENCOUNTER — OFFICE VISIT (OUTPATIENT)
Dept: CARDIOLOGY | Facility: CLINIC | Age: 26
End: 2021-08-02

## 2021-08-02 VITALS
HEART RATE: 76 BPM | HEIGHT: 73 IN | WEIGHT: 185 LBS | BODY MASS INDEX: 24.52 KG/M2 | DIASTOLIC BLOOD PRESSURE: 66 MMHG | SYSTOLIC BLOOD PRESSURE: 100 MMHG

## 2021-08-02 DIAGNOSIS — R94.31 ABNORMAL ECG: ICD-10-CM

## 2021-08-02 DIAGNOSIS — R09.1 PLEURISY: Primary | ICD-10-CM

## 2021-08-02 PROCEDURE — 99214 OFFICE O/P EST MOD 30 MIN: CPT | Performed by: NURSE PRACTITIONER

## 2021-08-02 PROCEDURE — 93000 ELECTROCARDIOGRAM COMPLETE: CPT | Performed by: NURSE PRACTITIONER

## 2021-08-02 NOTE — PATIENT INSTRUCTIONS
Pleurisy    Pleurisy is irritation and inflammation of the linings of the lungs (pleura). Pleura cover the outside of the lungs and the inside of the chest wall.  Normally, there is a small amount of fluid (pleural fluid) between the pleura that allows the lungs to move in and out smoothly when you breathe. Pleurisy can cause the pleura to be rough and dry and rub together when breathing, making it difficult to breathe or cough. In some cases, pleurisy can be associated with a buildup of fluid between the pleura (pleural effusion). Pleurisy is also called pleuritis.  What are the causes?  Common causes of this condition include:  · A lung infection caused by bacteria or a virus.  · A blood clot that travels to the lungs (pulmonary embolism).  · Air leaking into the pleural space (pneumothorax). This can happen due to injury or trauma to the chest.  · Lung cancer or a lung tumor.  · Heart or chest surgery.  · Lung damage from inhaling asbestos.  · A lung reaction to certain medicines, or treatments for cancer, such as chemotherapy or radiation therapy to the chest.  · Diseases that can cause lung inflammation. These include rheumatoid arthritis, lupus, sickle cell disease, inflammatory bowel disease, and pancreatitis.  Sometimes, the cause of this condition is not known.  What are the signs or symptoms?  The main symptom of this condition is chest pain. The pain is usually on one side. Chest pain may start suddenly and be sharp or stabbing. It may become a constant dull ache. You may also feel pain in your back or shoulder. The pain may get worse when you cough, take deep breaths, or make sudden movements. Other symptoms may include:  · Shortness of breath.  · Noisy breathing (wheezing or rattling).  · Cough.  · Chills.  · Fever.  · Coughing up blood (hemoptysis) or yellowish mucus from your lungs (sputum).  Symptoms can be worse with certain positions, such as when lying down or lying to one side. Signs and symptoms  of pleurisy may be very similar to the signs and symptoms of a heart attack or inflammation of the heart (pericarditis).  How is this diagnosed?  This condition may be diagnosed based on:  · Your medical history, especially if you have heart or lung disease.  · Your symptoms.  · A physical exam. Your health care provider will listen to your breathing with a stethoscope to check for a rough, rubbing sound (friction rub) when you breathe. Your breath sounds may be muffled and decreased on the affected side.  · Tests. You may have:  ? Blood tests to check for infections or diseases and to measure the oxygen in your blood.  ? An EKG to look at your heart rhythm.  ? Imaging tests of your lungs. These may include a chest X-ray, ultrasound, an MRI, or a CT scan.  ? A procedure using a needle to remove pleural fluid for testing (thoracentesis).  How is this treated?  Treatment for this condition depends on the cause. Pleurisy that was caused by a virus usually clears up within 2 weeks. Treatment for pleurisy may include:  · NSAIDs, such as ibuprofen, to help relieve pain and inflammation.  · Antibiotic medicines, if your condition was caused by a bacterial infection.  · Prescription pain medicine or cough medicine.  · Blood-thinning (anticoagulant) medicines to treat blood clots, if your condition was caused by pulmonary embolism.  · Removal of pleural fluid (thoracentesis) or air, using a chest tube to vacuum fluid or air from the pleural space.  Follow these instructions at home:  Medicines  · Take over-the-counter and prescription medicines only as told by your health care provider.  · If you were prescribed an antibiotic, take it as told by your health care provider. Do not stop taking the antibiotic even if you start to feel better.  · If you were prescribed medicines to remove extra fluid from your lungs (diuretics), take them as told by your health care provider.  · If you were prescribed an anticoagulant, take it  exactly as told by your health care provider. This is important.  Activity  · Rest and return to your normal activities as told by your health care provider. Ask your health care provider what activities are safe for you.  · Ask your health care provider if the medicine prescribed to you requires you to avoid driving or using machinery.  General instructions    · Watch for any changes in your condition.  · Take deep breaths often, even if it is painful. This can help prevent lung infection (pneumonia) and collapse of lung tissue (atelectasis).  · You may be given a medical device (incentive spirometer) to help exercise your lungs and breathing, to prevent lung complications.  · Do not use any products that contain nicotine or tobacco, such as cigarettes, e-cigarettes, and chewing tobacco. If you need help quitting, ask your health care provider.  · Keep all follow-up visits as told by your health care provider. This is important.  Contact a health care provider if:  · You have pain that:  ? Gets worse or more frequent.  ? Does not get better with medicines you were prescribed.  · You have a fever or chills.  · Your cough or shortness of breath is not improving or getting worse.  · You cough up pus-like (purulent) fluid.  Get help right away if:  · You cough up blood.  · You have any of the following symptoms that get worse:  ? Difficulty breathing with activity, especially minimal activity.  ? Shortness of breath.  ? Wheezing.  · You have pain that spreads into your neck, arms, or jaw.  · You feel dizzy or faint.  These symptoms may represent a serious problem that is an emergency. Do not wait to see if the symptoms will go away. Get medical help right away. Call your local emergency services (911 in the [place] Do not drive yourself to the hospital.  Summary  · Pleurisy is inflammation of the linings of the lungs. Pleurisy causes pain that makes it difficult for you to breathe or cough.  · Do not use any products  that contain nicotine or tobacco, such as cigarettes, e-cigarettes, and chewing tobacco. If you need help quitting, ask your health care provider.  · Rest and return to your normal activities as told by your health care provider. Ask your health care provider what activities are safe for you.  · Keep all follow-up visits as told by your health care provider. This is important.  This information is not intended to replace advice given to you by your health care provider. Make sure you discuss any questions you have with your health care provider.  Document Revised: 01/21/2021 Document Reviewed: 01/21/2021  Elsevier Patient Education © 2021 [employer]

## 2021-08-02 NOTE — PROGRESS NOTES
"Date of Office Visit: 21  Encounter Provider: ERINN Vega  Place of Service: [facility]  Patient Name: Wesley Brothers  :1995    Chief Complaint   Patient presents with   • Chest Pain   • Shortness of Breath   • Follow-up   :     HPI: Wesley Brothers is a 25 y.o. male  with history of anxiety, acid reflux, pleuritic chest pain and pleural effusion.      He had a normal treadmill in 2021.  Echocardiogram showed normal left ventricular systolic function and trace mitral valve regurgitation.    He was evaluated at [place] emergency room with complaint of left shoulder and chest pain that began 3 days prior.   I reviewed ED notes. The chest pain worsened with inspiration.  Troponin negative.  Magnesium was normal.  BNP was normal.  He was treated for pleurisy but then had a follow-up chest x-ray which showed small amount of pleural effusion.  He was treated with antibiotic.      He presents today for reassessment.  His pain was initially better but he is now having mild left-sided chest discomfort with deep breath.  He is no longer taking ibuprofen.  He denies palpitation, lightheadedness, near-syncope or syncope.  He denies shortness of breath.  Allergies   Allergen Reactions   • Fish-Derived Products Anaphylaxis   • Uncaria Tomentosa (Cats Claw) Swelling           Family and social history reviewed.     ROS  All other systems were reviewed and are negative          Objective:     Vitals:    21 1511   BP: 100/66   BP Location: Left arm   Patient Position: Sitting   Pulse: 76   Weight: 83.9 kg (185 lb)   Height: 185.4 cm (73\")     Body mass index is 24.41 kg/m².    PHYSICAL EXAM:  Constitutional:       General: Not in acute distress.     Appearance: Well-developed. Not diaphoretic.   HENT:      Head: Normocephalic.   Pulmonary:      Effort: Pulmonary effort is normal. No respiratory distress.      Breath sounds: Examination of the left-lower field " reveals decreased breath sounds. Decreased breath sounds present. No wheezing. No rhonchi. No rales.   Cardiovascular:      Normal rate. Regular rhythm.   Pulses:     Radial: 2+ bilaterally.  Skin:     General: Skin is warm and dry. There is no cyanosis.      Findings: No rash.   Neurological:      Mental Status: Alert and oriented to person, place, and time.   Psychiatric:         Behavior: Behavior normal.         Thought Content: Thought content normal.         Judgment: Judgment normal.           ECG 12 Lead    Date/Time: 8/2/2021 5:27 PM  Performed by: Shalonda Cobb APRN  Authorized by: Shalonda Cobb APRN   Comparison: compared with previous ECG   Similar to previous ECG  Rhythm: sinus arrhythmia  Rate: normal  QRS axis: left                Current Outpatient Medications   Medication Sig Dispense Refill   • amoxicillin-clavulanate (Augmentin) 875-125 MG per tablet Take 1 tablet by mouth 2 (Two) Times a Day. 20 tablet 0   • EPINEPHrine (EpiPen 2-Patric) 0.3 MG/0.3ML solution auto-injector injection      • esomeprazole (nexIUM) 40 MG capsule Take 1 capsule by mouth Every Morning Before Breakfast. 90 capsule 1   • venlafaxine XR (Effexor XR) 37.5 MG 24 hr capsule Take 1 capsule by mouth Daily. 30 capsule 1     No current facility-administered medications for this visit.     Assessment:       Diagnosis Plan   1. Pleurisy          No orders of the defined types were placed in this encounter.        Plan:   1.  25-year-old gentleman with pleuritic chest pain in the setting of small pleural effusion noted on CT scan.  He has been treated for lung infection and is on his second course of antibiotic which emphasize importance to complete.  I advised that he start taking ibuprofen again which he has not been taking every 6-8 hours while awake for the next 3 to 5 days  - ECG is without ischemic changes  -He was advised to also follow-up with his PCP  2.  Normal treadmill ECG and unremarkable echo February 2021  3.  Trace  mitral valve regurgitation on echo 02/2021  4. History of anxiety- on Effexor  5. History of Esophageal Reflux takes Nexium as needed  6. Abnormal ecg- stable    Follow-up on as-needed basis            It has been a pleasure to participate in this patient's care.      Thank you,  ERINN Vega      **I used Dragon to dictate this note:**

## 2021-08-11 ENCOUNTER — OFFICE VISIT (OUTPATIENT)
Dept: FAMILY MEDICINE CLINIC | Facility: CLINIC | Age: 26
End: 2021-08-11

## 2021-08-11 VITALS
DIASTOLIC BLOOD PRESSURE: 72 MMHG | HEIGHT: 73 IN | BODY MASS INDEX: 25.05 KG/M2 | WEIGHT: 189 LBS | TEMPERATURE: 97.3 F | OXYGEN SATURATION: 97 % | HEART RATE: 86 BPM | SYSTOLIC BLOOD PRESSURE: 109 MMHG

## 2021-08-11 DIAGNOSIS — E78.00 HIGH CHOLESTEROL: ICD-10-CM

## 2021-08-11 DIAGNOSIS — K21.9 GASTROESOPHAGEAL REFLUX DISEASE WITHOUT ESOPHAGITIS: ICD-10-CM

## 2021-08-11 DIAGNOSIS — R09.1 PLEURISY: Primary | ICD-10-CM

## 2021-08-11 PROCEDURE — 99213 OFFICE O/P EST LOW 20 MIN: CPT | Performed by: FAMILY MEDICINE

## 2021-08-11 RX ORDER — IBUPROFEN 600 MG/1
600 TABLET ORAL EVERY 8 HOURS PRN
Qty: 60 TABLET | Refills: 1 | Status: SHIPPED | OUTPATIENT
Start: 2021-08-11

## 2021-08-11 RX ORDER — ESOMEPRAZOLE MAGNESIUM 40 MG/1
40 CAPSULE, DELAYED RELEASE ORAL DAILY PRN
Qty: 90 CAPSULE | Refills: 0 | Status: SHIPPED | OUTPATIENT
Start: 2021-08-11

## 2021-08-11 NOTE — PROGRESS NOTES
"Chief Complaint  Follow-up (from hospital visits and cardiology ) and Med Refill (ibuprofen )    Subjective          Wesley Brothers presents to [facility]  History of Present Illness  Went to [facility] on 7/21/21 and 7/28/21, had a EKG, and seen by cardio and was told was  Pleurisy, had CXR, got Ibuprofen patient feels ibuprofen helps bedtime, no chest pain or shortness of breath, no abdominal pain, patient does have acid reflux sometimes, he did have a CAT scan he would like to repeat the CAT scan this month again,  Objective   Vital Signs:   /72 (BP Location: Left arm, Patient Position: Sitting)   Pulse 86   Temp 97.3 °F (36.3 °C) (Temporal)   Ht 185.4 cm (72.99\")   Wt 85.7 kg (189 lb)   SpO2 97%   BMI 24.94 kg/m²     Physical Exam  Vitals and nursing note reviewed.   Constitutional:       Appearance: He is well-developed.   HENT:      Head: Normocephalic and atraumatic.      Right Ear: External ear normal.      Left Ear: External ear normal.      Nose: Nose normal.   Eyes:      General: No scleral icterus.        Right eye: No discharge.         Left eye: No discharge.      Conjunctiva/sclera: Conjunctivae normal.      Pupils: Pupils are equal, round, and reactive to light.   Neck:      Thyroid: No thyromegaly.      Vascular: No JVD.      Trachea: No tracheal deviation.   Cardiovascular:      Rate and Rhythm: Normal rate and regular rhythm.      Heart sounds: Normal heart sounds. No murmur heard.   No friction rub. No gallop.    Pulmonary:      Effort: Pulmonary effort is normal. No respiratory distress.      Breath sounds: Normal breath sounds. No wheezing or rales.   Chest:      Chest wall: No tenderness.   Abdominal:      General: Bowel sounds are normal. There is no distension.      Palpations: Abdomen is soft. There is no mass.      Tenderness: There is no abdominal tenderness. There is no guarding or rebound.      Hernia: No hernia is present.   Musculoskeletal:        "  General: No tenderness. Normal range of motion.      Cervical back: Normal range of motion and neck supple.   Lymphadenopathy:      Cervical: No cervical adenopathy.   Skin:     General: Skin is warm and dry.   Neurological:      Mental Status: He is alert.      Cranial Nerves: No cranial nerve deficit.      Sensory: No sensory deficit.      Motor: No abnormal muscle tone.      Coordination: Coordination normal.      Deep Tendon Reflexes: Reflexes normal.   Psychiatric:         Behavior: Behavior normal.         Thought Content: Thought content normal.         Judgment: Judgment normal.        Result Review :                 Assessment and Plan    Diagnoses and all orders for this visit:    1. Pleurisy (Primary)  -     ibuprofen (ADVIL,MOTRIN) 600 MG tablet; Take 1 tablet by mouth Every 8 (Eight) Hours As Needed for Moderate Pain .  Dispense: 60 tablet; Refill: 1  -     Cancel: CT Chest With Contrast; Future  -     CT Chest With Contrast; Future    2. Gastroesophageal reflux disease without esophagitis  -     esomeprazole (nexIUM) 40 MG capsule; Take 1 capsule by mouth Daily As Needed (acid reflux).  Dispense: 90 capsule; Refill: 0    3. High cholesterol  -     Comprehensive Metabolic Panel; Future  -     Lipid Panel; Future        Follow Up   Return if symptoms worsen or fail to improve.  Patient was given instructions and counseling regarding his condition or for health maintenance advice. Please see specific information pulled into the AVS if appropriate.   disCussed with patient risk of taking ibuprofen and also Nexium,

## 2021-08-24 ENCOUNTER — APPOINTMENT (OUTPATIENT)
Dept: PET IMAGING | Facility: HOSPITAL | Age: 26
End: 2021-08-24

## 2021-09-03 ENCOUNTER — HOSPITAL ENCOUNTER (OUTPATIENT)
Dept: CT IMAGING | Facility: HOSPITAL | Age: 26
Discharge: HOME | End: 2021-09-03
Admitting: FAMILY MEDICINE

## 2021-09-03 ENCOUNTER — HOSPITAL ENCOUNTER (OUTPATIENT)
Dept: CT IMAGING | Facility: HOSPITAL | Age: 26
End: 2021-09-03

## 2021-09-03 DIAGNOSIS — R09.1 PLEURISY: ICD-10-CM

## 2021-09-03 PROCEDURE — 71260 CT THORAX DX C+: CPT

## 2021-09-03 PROCEDURE — 25010000002 IOPAMIDOL 61 % SOLUTION: Performed by: FAMILY MEDICINE

## 2021-09-03 PROCEDURE — 82565 ASSAY OF CREATININE: CPT

## 2021-09-03 RX ADMIN — IOPAMIDOL 100 ML: 612 INJECTION, SOLUTION INTRAVENOUS at 19:05

## 2021-09-07 LAB — CREAT BLDA-MCNC: 0.6 MG/DL (ref 0.6–1.3)

## 2021-09-20 ENCOUNTER — TELEPHONE (OUTPATIENT)
Dept: FAMILY MEDICINE CLINIC | Facility: CLINIC | Age: 26
End: 2021-09-20

## 2021-09-20 DIAGNOSIS — T78.2XXS ANAPHYLAXIS, SEQUELA: Primary | ICD-10-CM

## 2021-09-20 DIAGNOSIS — J90 PLEURAL EFFUSION: ICD-10-CM

## 2021-09-20 DIAGNOSIS — R09.1 PLEURISY: ICD-10-CM

## 2021-09-20 RX ORDER — EPINEPHRINE 0.3 MG/.3ML
0.3 INJECTION SUBCUTANEOUS ONCE
Qty: 2 EACH | Refills: 1 | Status: SHIPPED | OUTPATIENT
Start: 2021-09-20 | End: 2021-09-20

## 2021-09-20 NOTE — TELEPHONE ENCOUNTER
I contacted patient because he still having pleural effusion on the left side, and instead of  waiting a month to repeat the CAT scan and he already had 2 CAT scans done before  he would like to proceed and see a pulmonologist, referral done, also she is due to do a CMP and a lipid panel

## 2021-12-23 ENCOUNTER — TELEPHONE (OUTPATIENT)
Dept: FAMILY MEDICINE CLINIC | Facility: CLINIC | Age: 26
End: 2021-12-23

## 2021-12-23 DIAGNOSIS — R93.89 ABNORMAL CT OF THE CHEST: Primary | ICD-10-CM

## 2021-12-23 NOTE — TELEPHONE ENCOUNTER
Caller: Wesley Brothers    Relationship: Self    Best call back number: [phone]    What is the best time to reach you: ANY TIME     Who are you requesting to speak with (clinical staff, provider,  specific staff member): CLINICAL STAFF    What was the call regarding:   PATIENT AND MOM CALLED IN CURIOUS ABOUT GETTING HIM SET UP FOR THE MONOCLONAL ANTIBODY INFUSION TREATMENT.     PATIENT STATES HE WENT AND GOT TESTED Tuesday 12/21/21 AND RECEIVED POSITIVE RES[facility].    PATIENT'S SYMPTOMS STARTED BEFORE HE GOT TESTED. HIS SYMPTOMS STARTED Far[place] NIGHT 12/19/21.    CURRENT SYMPTOMS: FEVER, COUGHING YELLOW PHLEGM, DIARRHEA, BODY ACHES.       PLEASE CALL AND ADVISE. [phone] ( PATIENT ) OR MOMS PHONE ( [phone] )    
none required/staying w/ patient

## 2022-01-27 ENCOUNTER — HOSPITAL ENCOUNTER (OUTPATIENT)
Dept: CT IMAGING | Facility: HOSPITAL | Age: 27
Discharge: HOME | End: 2022-01-27
Admitting: FAMILY MEDICINE

## 2022-01-27 DIAGNOSIS — R93.89 ABNORMAL CT OF THE CHEST: ICD-10-CM

## 2022-01-27 PROCEDURE — 25010000002 IOPAMIDOL 61 % SOLUTION: Performed by: FAMILY MEDICINE

## 2022-01-27 PROCEDURE — 71260 CT THORAX DX C+: CPT

## 2022-01-27 RX ADMIN — IOPAMIDOL 75 ML: 612 INJECTION, SOLUTION INTRAVENOUS at 15:33

## 2022-07-12 ENCOUNTER — TELEPHONE (OUTPATIENT)
Dept: FAMILY MEDICINE CLINIC | Facility: CLINIC | Age: 27
End: 2022-07-12

## 2022-07-12 NOTE — TELEPHONE ENCOUNTER
Caller: Wesley Brothers    Relationship: Self    Best call back number: [phone]    What is the best time to reach you: ANYTIME    Who are you requesting to speak with (clinical staff, provider,  specific staff member): PCP OR MEDICAL ASSISTANT    Do you know the name of the person who called: SELF    What was the call regarding: THE PATIENT STATES THAT HE HAS BEEN EXPERIENCING A NEW PAIN IN HIS RIGHT ANKLE AREA WHERE HIS LEG MEETS HIS FOOT. THE PAIN IS NOT CONSISTENT, BUT IT WILL HAPPEN RANDOMLY. THE PATIENT DESCRIBES THIS PAIN AS SOMEONE GRABBING AND SQUEEZING A VEIN AND THEN IT WILL GO AWAY QUICKLY. THE PATIENT STATES THAT THIS PAIN HAS BEEN ONGOING FOR 3 WEEKS AND IT IS INCREASING IN SEVERITY.     Do you require a callback: YES, PLEASE